# Patient Record
Sex: FEMALE | Employment: FULL TIME | URBAN - METROPOLITAN AREA
[De-identification: names, ages, dates, MRNs, and addresses within clinical notes are randomized per-mention and may not be internally consistent; named-entity substitution may affect disease eponyms.]

---

## 2022-03-30 ENCOUNTER — TELEPHONE (OUTPATIENT)
Dept: FAMILY MEDICINE | Facility: CLINIC | Age: 27
End: 2022-03-30

## 2022-04-01 ENCOUNTER — OFFICE VISIT (OUTPATIENT)
Dept: FAMILY MEDICINE | Facility: CLINIC | Age: 27
End: 2022-04-01
Payer: COMMERCIAL

## 2022-04-01 ENCOUNTER — TELEPHONE (OUTPATIENT)
Dept: FAMILY MEDICINE | Facility: CLINIC | Age: 27
End: 2022-04-01

## 2022-04-01 ENCOUNTER — HOSPITAL ENCOUNTER (OUTPATIENT)
Dept: RADIOLOGY | Facility: HOSPITAL | Age: 27
Discharge: HOME | End: 2022-04-01
Attending: FAMILY MEDICINE
Payer: COMMERCIAL

## 2022-04-01 VITALS
DIASTOLIC BLOOD PRESSURE: 78 MMHG | BODY MASS INDEX: 26.72 KG/M2 | SYSTOLIC BLOOD PRESSURE: 112 MMHG | HEART RATE: 75 BPM | HEIGHT: 64 IN | WEIGHT: 156.5 LBS | OXYGEN SATURATION: 99 % | RESPIRATION RATE: 18 BRPM

## 2022-04-01 DIAGNOSIS — J02.8 PHARYNGITIS DUE TO OTHER ORGANISM: ICD-10-CM

## 2022-04-01 DIAGNOSIS — M25.562 LEFT KNEE PAIN, UNSPECIFIED CHRONICITY: Primary | ICD-10-CM

## 2022-04-01 DIAGNOSIS — M25.562 LEFT KNEE PAIN, UNSPECIFIED CHRONICITY: ICD-10-CM

## 2022-04-01 PROCEDURE — 73560 X-RAY EXAM OF KNEE 1 OR 2: CPT | Mod: 26,RT,, | Performed by: RADIOLOGY

## 2022-04-01 PROCEDURE — 3078F PR MOST RECENT DIASTOLIC BLOOD PRESSURE < 80 MM HG: ICD-10-PCS | Mod: CPTII,S$GLB,, | Performed by: FAMILY MEDICINE

## 2022-04-01 PROCEDURE — 73562 XR KNEE ORTHO LEFT: ICD-10-PCS | Mod: 26,LT,, | Performed by: RADIOLOGY

## 2022-04-01 PROCEDURE — 99999 PR PBB SHADOW E&M-EST. PATIENT-LVL IV: ICD-10-PCS | Mod: PBBFAC,,, | Performed by: FAMILY MEDICINE

## 2022-04-01 PROCEDURE — 99999 PR PBB SHADOW E&M-EST. PATIENT-LVL IV: CPT | Mod: PBBFAC,,, | Performed by: FAMILY MEDICINE

## 2022-04-01 PROCEDURE — 1160F RVW MEDS BY RX/DR IN RCRD: CPT | Mod: CPTII,S$GLB,, | Performed by: FAMILY MEDICINE

## 2022-04-01 PROCEDURE — 3078F DIAST BP <80 MM HG: CPT | Mod: CPTII,S$GLB,, | Performed by: FAMILY MEDICINE

## 2022-04-01 PROCEDURE — 3008F PR BODY MASS INDEX (BMI) DOCUMENTED: ICD-10-PCS | Mod: CPTII,S$GLB,, | Performed by: FAMILY MEDICINE

## 2022-04-01 PROCEDURE — 99204 OFFICE O/P NEW MOD 45 MIN: CPT | Mod: S$GLB,,, | Performed by: FAMILY MEDICINE

## 2022-04-01 PROCEDURE — 3008F BODY MASS INDEX DOCD: CPT | Mod: CPTII,S$GLB,, | Performed by: FAMILY MEDICINE

## 2022-04-01 PROCEDURE — 1160F PR REVIEW ALL MEDS BY PRESCRIBER/CLIN PHARMACIST DOCUMENTED: ICD-10-PCS | Mod: CPTII,S$GLB,, | Performed by: FAMILY MEDICINE

## 2022-04-01 PROCEDURE — 99204 PR OFFICE/OUTPT VISIT, NEW, LEVL IV, 45-59 MIN: ICD-10-PCS | Mod: S$GLB,,, | Performed by: FAMILY MEDICINE

## 2022-04-01 PROCEDURE — 1159F MED LIST DOCD IN RCRD: CPT | Mod: CPTII,S$GLB,, | Performed by: FAMILY MEDICINE

## 2022-04-01 PROCEDURE — 3074F SYST BP LT 130 MM HG: CPT | Mod: CPTII,S$GLB,, | Performed by: FAMILY MEDICINE

## 2022-04-01 PROCEDURE — 1159F PR MEDICATION LIST DOCUMENTED IN MEDICAL RECORD: ICD-10-PCS | Mod: CPTII,S$GLB,, | Performed by: FAMILY MEDICINE

## 2022-04-01 PROCEDURE — 73560 XR KNEE ORTHO LEFT: ICD-10-PCS | Mod: 26,RT,, | Performed by: RADIOLOGY

## 2022-04-01 PROCEDURE — 3074F PR MOST RECENT SYSTOLIC BLOOD PRESSURE < 130 MM HG: ICD-10-PCS | Mod: CPTII,S$GLB,, | Performed by: FAMILY MEDICINE

## 2022-04-01 PROCEDURE — 73560 X-RAY EXAM OF KNEE 1 OR 2: CPT | Mod: TC,FY,PO,RT

## 2022-04-01 PROCEDURE — 73562 X-RAY EXAM OF KNEE 3: CPT | Mod: 26,LT,, | Performed by: RADIOLOGY

## 2022-04-01 RX ORDER — CETIRIZINE HYDROCHLORIDE 10 MG/1
10 TABLET ORAL DAILY
COMMUNITY

## 2022-04-01 RX ORDER — AZITHROMYCIN 250 MG/1
TABLET, FILM COATED ORAL
Qty: 6 TABLET | Refills: 0 | Status: SHIPPED | OUTPATIENT
Start: 2022-04-01 | End: 2022-04-06

## 2022-04-01 RX ORDER — MULTIVITAMIN
1 TABLET ORAL DAILY
COMMUNITY
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
## 2022-04-05 ENCOUNTER — TELEPHONE (OUTPATIENT)
Dept: FAMILY MEDICINE | Facility: CLINIC | Age: 27
End: 2022-04-05
Payer: COMMERCIAL

## 2022-04-05 NOTE — TELEPHONE ENCOUNTER
----- Message from Juan Snow MD sent at 4/1/2022  9:36 PM CDT -----  Please let her know the XR of her knee is normal.

## 2022-08-04 ENCOUNTER — OCCUPATIONAL HEALTH (OUTPATIENT)
Dept: URGENT CARE | Facility: CLINIC | Age: 27
End: 2022-08-04

## 2022-08-04 DIAGNOSIS — Z13.9 ENCOUNTER FOR SCREENING: Primary | ICD-10-CM

## 2022-08-04 LAB — COLLECTION ONLY: NORMAL

## 2022-08-04 PROCEDURE — 80305 DRUG TEST PRSMV DIR OPT OBS: CPT | Mod: S$GLB,,, | Performed by: EMERGENCY MEDICINE

## 2022-08-04 PROCEDURE — 80305 MEDTOX HAIR COLLECTION ONLY: ICD-10-PCS | Mod: S$GLB,,, | Performed by: EMERGENCY MEDICINE

## 2024-08-11 ENCOUNTER — OCCUPATIONAL HEALTH (OUTPATIENT)
Dept: URGENT CARE | Facility: CLINIC | Age: 29
End: 2024-08-11

## 2024-08-11 DIAGNOSIS — Z23 IMMUNIZATION DUE: Primary | ICD-10-CM

## 2024-08-11 PROCEDURE — 86580 TB INTRADERMAL TEST: CPT | Mod: ,,, | Performed by: EMERGENCY MEDICINE

## 2024-09-16 ENCOUNTER — OFFICE VISIT (OUTPATIENT)
Dept: URGENT CARE | Facility: CLINIC | Age: 29
End: 2024-09-16
Payer: COMMERCIAL

## 2024-09-16 VITALS
RESPIRATION RATE: 17 BRPM | DIASTOLIC BLOOD PRESSURE: 77 MMHG | BODY MASS INDEX: 26.63 KG/M2 | WEIGHT: 156 LBS | OXYGEN SATURATION: 98 % | TEMPERATURE: 99 F | HEART RATE: 69 BPM | HEIGHT: 64 IN | SYSTOLIC BLOOD PRESSURE: 121 MMHG

## 2024-09-16 DIAGNOSIS — R11.10 VOMITING, UNSPECIFIED VOMITING TYPE, UNSPECIFIED WHETHER NAUSEA PRESENT: Primary | ICD-10-CM

## 2024-09-16 DIAGNOSIS — K29.70 GASTRITIS, PRESENCE OF BLEEDING UNSPECIFIED, UNSPECIFIED CHRONICITY, UNSPECIFIED GASTRITIS TYPE: ICD-10-CM

## 2024-09-16 LAB
CTP QC/QA: YES
CTP QC/QA: YES
FLUAV AG NPH QL: NEGATIVE
FLUBV AG NPH QL: NEGATIVE
SARS-COV-2 AG RESP QL IA.RAPID: NEGATIVE

## 2024-09-16 RX ORDER — ONDANSETRON 4 MG/1
8 TABLET, ORALLY DISINTEGRATING ORAL
Status: COMPLETED | OUTPATIENT
Start: 2024-09-16 | End: 2024-09-16

## 2024-09-16 RX ORDER — ONDANSETRON 4 MG/1
4 TABLET, ORALLY DISINTEGRATING ORAL EVERY 6 HOURS PRN
Qty: 20 TABLET | Refills: 0 | Status: SHIPPED | OUTPATIENT
Start: 2024-09-16

## 2024-09-16 RX ADMIN — ONDANSETRON 8 MG: 4 TABLET, ORALLY DISINTEGRATING ORAL at 04:09

## 2024-09-16 NOTE — PROGRESS NOTES
"Subjective:      Patient ID: Ritu Galloway is a 29 y.o. female.    Vitals:  height is 5' 4" (1.626 m) and weight is 70.8 kg (156 lb). Her oral temperature is 98.9 °F (37.2 °C). Her blood pressure is 121/77 and her pulse is 69. Her respiration is 17 and oxygen saturation is 98%.     Chief Complaint: Emesis and Diarrhea    Ambulatory to room with complaint of nausea and vomiting with mild diarrhea symptoms began yesterday.  Denies abdominal pain.    Emesis   This is a new problem. The current episode started yesterday. The problem has been unchanged. There has been no fever. Associated symptoms include chills and diarrhea. Pertinent negatives include no abdominal pain or fever.       Constitution: Positive for chills. Negative for fever.   Gastrointestinal:  Positive for nausea, vomiting and diarrhea. Negative for abdominal pain.      Objective:     Physical Exam   Constitutional: She is oriented to person, place, and time. She appears well-developed.   HENT:   Head: Normocephalic and atraumatic.   Ears:   Right Ear: External ear normal.   Left Ear: External ear normal.   Nose: Nose normal.   Mouth/Throat: Mucous membranes are normal.   Eyes: Conjunctivae and lids are normal.   Neck: Trachea normal. Neck supple.   Cardiovascular: Normal rate, regular rhythm and normal heart sounds.   Pulmonary/Chest: Effort normal and breath sounds normal. No respiratory distress.   Abdominal: Normal appearance and bowel sounds are normal. She exhibits no distension and no mass. Soft. There is no abdominal tenderness. There is no rebound and no guarding.      Comments: Benign abdominal exam   Musculoskeletal: Normal range of motion.         General: Normal range of motion.   Neurological: She is alert and oriented to person, place, and time. She has normal strength.   Skin: Skin is warm, dry, intact, not diaphoretic and not pale.   Psychiatric: Her speech is normal and behavior is normal. Judgment and thought content normal. "   Nursing note and vitals reviewed.      Assessment:     1. Vomiting, unspecified vomiting type, unspecified whether nausea present    2. Gastritis, presence of bleeding unspecified, unspecified chronicity, unspecified gastritis type        Plan:       Vomiting, unspecified vomiting type, unspecified whether nausea present  -     SARS Coronavirus 2 Antigen, POCT Manual Read  -     POCT Influenza A/B Rapid Antigen    Gastritis, presence of bleeding unspecified, unspecified chronicity, unspecified gastritis type    Other orders  -     ondansetron disintegrating tablet 8 mg  -     ondansetron (ZOFRAN-ODT) 4 MG TbDL; Take 1 tablet (4 mg total) by mouth every 6 (six) hours as needed.  Dispense: 20 tablet; Refill: 0           Flu and COVID negative   - To ED for any new or acutely worsening symptoms including but not limited to chest pain, palpitations, shortness of breath, or fever greater than 103° F.  Patient in agreement with plan of care.    - The diagnosis, treatment plan, instructions for follow-up and reevaluation as well as ED precautions were discussed and understanding was verbalized. All questions or concerns have been addressed.  -Follow up with your primary care provider for continued evaluation and management.

## 2024-09-16 NOTE — LETTER
September 16, 2024      [facility]  [address]  [address]  [place] [address]  Phone: [phone]  Fax: [phone]       Patient: Ritu Galloway   YOB: 1995  Date of Visit: 09/16/2024    To Whom It May Concern:    Riley Galloway  was at [facility] on 09/16/2024. The patient may return to work/school on 09/18/2024 with no restrictions. If you have any questions or concerns, or if I can be of further assistance, please do not hesitate to contact me.    Sincerely,    Sandhya Adams RT

## 2024-10-21 ENCOUNTER — OFFICE VISIT (OUTPATIENT)
Dept: URGENT CARE | Facility: CLINIC | Age: 29
End: 2024-10-21
Payer: OTHER MISCELLANEOUS

## 2024-10-21 VITALS
HEART RATE: 82 BPM | DIASTOLIC BLOOD PRESSURE: 73 MMHG | BODY MASS INDEX: 26.63 KG/M2 | RESPIRATION RATE: 16 BRPM | WEIGHT: 156 LBS | HEIGHT: 64 IN | TEMPERATURE: 99 F | OXYGEN SATURATION: 97 % | SYSTOLIC BLOOD PRESSURE: 105 MMHG

## 2024-10-21 DIAGNOSIS — Z02.83 ENCOUNTER FOR DRUG SCREENING: ICD-10-CM

## 2024-10-21 DIAGNOSIS — S99.912A INJURY OF LEFT ANKLE AND FOOT, INITIAL ENCOUNTER: Primary | ICD-10-CM

## 2024-10-21 DIAGNOSIS — S99.922A INJURY OF LEFT ANKLE AND FOOT, INITIAL ENCOUNTER: Primary | ICD-10-CM

## 2024-10-21 DIAGNOSIS — Z02.6 ENCOUNTER RELATED TO WORKER'S COMPENSATION CLAIM: ICD-10-CM

## 2024-10-21 DIAGNOSIS — S86.012A STRAIN OF LEFT ACHILLES TENDON, INITIAL ENCOUNTER: ICD-10-CM

## 2024-10-21 LAB
BREATH ALCOHOL: 0
CTP QC/QA: YES
POC 10 PANEL DRUG SCREEN: NEGATIVE

## 2024-10-21 PROCEDURE — 73610 X-RAY EXAM OF ANKLE: CPT | Mod: LT,S$GLB,, | Performed by: RADIOLOGY

## 2024-10-21 RX ORDER — DICLOFENAC SODIUM 10 MG/G
2 GEL TOPICAL 4 TIMES DAILY
Qty: 100 G | Refills: 1 | Status: SHIPPED | OUTPATIENT
Start: 2024-10-21

## 2024-10-21 RX ORDER — MELOXICAM 7.5 MG/1
7.5 TABLET ORAL DAILY
Qty: 30 TABLET | Refills: 1 | Status: SHIPPED | OUTPATIENT
Start: 2024-10-21

## 2024-10-21 NOTE — PROGRESS NOTES
Subjective:      Patient ID: Ritu Galloway is a 29 y.o. female.    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  She turned and twisted her left ankle from a fall.   What have you taken OTC for your symptoms: n/a  What is your current pain scale out of 10? 4.5-5        Ankle Pain   The incident occurred 1 to 3 hours ago. The incident occurred at work. There was no injury mechanism. The pain is at a severity of 5/10. The pain is moderate. She reports no foreign bodies present. Nothing aggravates the symptoms. She has tried nothing for the symptoms. The treatment provided mild relief.     ROS  Objective:     Physical Exam  Musculoskeletal:        Legs:         Antalgic gait    Assessment:      1. Injury of left ankle and foot, initial encounter    2. Encounter related to worker's compensation claim    3. Encounter for drug screening    4. Strain of left Achilles tendon, initial encounter      Plan:       Medications Ordered This Encounter   Medications    diclofenac sodium (VOLTAREN) 1 % Gel     Sig: Apply 2 g topically 4 (four) times daily.     Dispense:  100 g     Refill:  1    meloxicam (MOBIC) 7.5 MG tablet     Sig: Take 1 tablet (7.5 mg total) by mouth once daily.     Dispense:  30 tablet     Refill:  1     Patient Instructions: Daily home exercises/warm soaks, Attention not to aggravate affected area, Use splint as directed   Restrictions: Sit or stand as needed, No Prolonged standing/walking, Avoid climbing/kneeling/squatting (work as tolerated)  No follow-ups on file.

## 2024-10-21 NOTE — LETTER
[facility]  [address], [address]  [facility] [address]  Phone: [phone]  Fax: [phone]  [facility]: [phone]    Pt Name: Ritu Galloway  Injury Date: 10/21/2024   Employee ID: [id number] Date of First Treatment: 10/21/2024   Company: [employer]ed reference to record [id number]      Appointment Time: 02:15 PM Arrived: 300   Provider: Crsitiano Narvaez MD Time Out:410     Office Treatment:   1. Injury of left ankle and foot, initial encounter    2. Encounter related to worker's compensation claim    3. Encounter for drug screening    4. Strain of left Achilles tendon, initial encounter      Medications Ordered This Encounter   Medications    diclofenac sodium (VOLTAREN) 1 % Gel    meloxicam (MOBIC) 7.5 MG tablet      Patient Instructions: Daily home exercises/warm soaks, Attention not to aggravate affected area, Use splint as directed    Restrictions: Sit or stand as needed, No Prolonged standing/walking, Avoid climbing/kneeling/squatting (work as tolerated)     Return Appointment: 10/30      You have a work related injury. Medical care and treatment required as a result of a work-related injury  should be focused on restoring functional ability required to meet your daily and work activities and return to work, while striving to restore your health to pre-injury status in so far as is feasible.  If Rx a medication that can be sedating, DO NOT TAKE DURING YOUR WORK DAY.    Some OTC measures to help in recovery(if no allergies to, renal issues or pregnant):  Tylenol 325mg 3x per day  Take Pepcid 20mg BID  If applicable or discussed: Magnesium OTC daily; Topical Voltaren Gel; Lidocaine patches, neoprene OTC compression sleeve  Massage area if possible  Resting of the injured area  Ice for ankle, wrist or elbow injury  Elevation of the injured area if applicable  Heating pad for muscle injury  Stretching/ROM exercises  as described in clinic.   ** BE ADVISED: You should be in regular contact with your W/C  to know the status of your claim and /or (if any)pending referrals**

## 2024-10-30 ENCOUNTER — OFFICE VISIT (OUTPATIENT)
Dept: URGENT CARE | Facility: CLINIC | Age: 29
End: 2024-10-30
Payer: OTHER MISCELLANEOUS

## 2024-10-30 VITALS
RESPIRATION RATE: 16 BRPM | OXYGEN SATURATION: 97 % | SYSTOLIC BLOOD PRESSURE: 105 MMHG | TEMPERATURE: 98 F | WEIGHT: 156 LBS | HEIGHT: 64 IN | HEART RATE: 62 BPM | DIASTOLIC BLOOD PRESSURE: 78 MMHG | BODY MASS INDEX: 26.63 KG/M2

## 2024-10-30 DIAGNOSIS — S99.922A INJURY OF LEFT ANKLE AND FOOT, INITIAL ENCOUNTER: ICD-10-CM

## 2024-10-30 DIAGNOSIS — S86.012A STRAIN OF LEFT ACHILLES TENDON, INITIAL ENCOUNTER: ICD-10-CM

## 2024-10-30 DIAGNOSIS — Z02.6 ENCOUNTER RELATED TO WORKER'S COMPENSATION CLAIM: Primary | ICD-10-CM

## 2024-10-30 DIAGNOSIS — S99.912A INJURY OF LEFT ANKLE AND FOOT, INITIAL ENCOUNTER: ICD-10-CM

## 2024-10-30 PROCEDURE — 99214 OFFICE O/P EST MOD 30 MIN: CPT | Mod: S$GLB,,, | Performed by: FAMILY MEDICINE

## 2024-11-22 ENCOUNTER — TELEPHONE (OUTPATIENT)
Dept: URGENT CARE | Facility: CLINIC | Age: 29
End: 2024-11-22
Payer: COMMERCIAL

## 2024-11-22 NOTE — TELEPHONE ENCOUNTER
Called patient in reference to her missed [facility]. Health appointment and was unable to leave a message or a call back number due to the phone not being in service. The number listed for [employer] are the same numbers and the service has been disconnected.  AFG